# Patient Record
Sex: FEMALE | Race: WHITE | NOT HISPANIC OR LATINO | URBAN - METROPOLITAN AREA
[De-identification: names, ages, dates, MRNs, and addresses within clinical notes are randomized per-mention and may not be internally consistent; named-entity substitution may affect disease eponyms.]

---

## 2023-08-05 ENCOUNTER — OFFICE VISIT (OUTPATIENT)
Dept: URGENT CARE | Facility: CLINIC | Age: 48
End: 2023-08-05
Payer: COMMERCIAL

## 2023-08-05 VITALS
BODY MASS INDEX: 25.83 KG/M2 | WEIGHT: 155 LBS | HEART RATE: 60 BPM | HEIGHT: 65 IN | TEMPERATURE: 98.6 F | RESPIRATION RATE: 18 BRPM | OXYGEN SATURATION: 98 % | DIASTOLIC BLOOD PRESSURE: 77 MMHG | SYSTOLIC BLOOD PRESSURE: 128 MMHG

## 2023-08-05 DIAGNOSIS — L23.7 POISON IVY DERMATITIS: Primary | ICD-10-CM

## 2023-08-05 PROCEDURE — 99203 OFFICE O/P NEW LOW 30 MIN: CPT | Performed by: STUDENT IN AN ORGANIZED HEALTH CARE EDUCATION/TRAINING PROGRAM

## 2023-08-05 RX ORDER — TAMOXIFEN CITRATE 20 MG/1
TABLET ORAL
COMMUNITY
Start: 2023-08-01

## 2023-08-05 RX ORDER — PREDNISONE 10 MG/1
TABLET ORAL
Qty: 35 TABLET | Refills: 0 | Status: SHIPPED | OUTPATIENT
Start: 2023-08-05 | End: 2023-08-19

## 2023-08-05 NOTE — PROGRESS NOTES
[facility]        NAME: Marja Meigs is a 57 y.o. female  : 1975    MRN: [id number]  DATE: 2023  TIME: 10:31 AM    Assessment and Orders   Poison ivy dermatitis [L23.7]  1. Poison ivy dermatitis  predniSONE 10 mg tablet            Plan and Discussion      Given proximity to the eye, will treat with oral steroids versus topical.  Patient given 15-day course of oral prednisone. Discussed ED precautions including (but not limited to)  • Difficultly breathing or shortness of breath  • Chest pain  • Acutely worsening symptoms. Risks and benefits discussed. Patient understands and agrees with the plan. Follow up with PCP. Chief Complaint     Chief Complaint   Patient presents with   • Poison Ivy     Started 2 days ago; on face, stomach, arms,            History of Present Illness       Poison Reita Lime  This is a new problem. The current episode started in the past 7 days. The affected locations include the face. The rash is characterized by redness and itchiness. She was exposed to plant contact. Associated symptoms include eye pain and facial edema. Pertinent negatives include no anorexia, fever, shortness of breath, sore throat or vomiting. Past treatments include topical steroids. The treatment provided no relief. Review of Systems   Review of Systems   Constitutional: Negative for fever. HENT: Negative for sore throat. Eyes: Positive for pain. Respiratory: Negative for shortness of breath. Gastrointestinal: Negative for anorexia and vomiting. Current Medications       Current Outpatient Medications:   •  predniSONE 10 mg tablet, Take 4 tablets (40 mg total) by mouth daily for 5 days, THEN 2 tablets (20 mg total) daily for 5 days, THEN 1 tablet (10 mg total) daily for 5 days. , Disp: 35 tablet, Rfl: 0  •  tamoxifen (NOLVADEX) 20 mg tablet, , Disp: , Rfl:     Current Allergies     Allergies as of 2023 - Reviewed 2023   Allergen Reaction Noted • Nickel Itching 01/23/2019            The following portions of the patient's history were reviewed and updated as appropriate: allergies, current medications, past family history, past medical history, past social history, past surgical history and problem list.     Past Medical History:   Diagnosis Date   • Breast cancer [facility])        Past Surgical History:   Procedure Laterality Date   • TOTAL HIP ARTHROPLASTY Right 12/2022       History reviewed. No pertinent family history. Medications have been verified. Objective   /77   Pulse 60   Temp 98.6 °F (37 °C)   Resp 18   Ht 5' 5" (1.651 m)   Wt 70.3 kg (155 lb)   LMP  (LMP Unknown)   SpO2 98%   BMI 25.79 kg/m²   No LMP recorded (lmp unknown). Physical Exam     Physical Exam  Constitutional:       General: She is not in acute distress. Appearance: She is normal weight. Cardiovascular:      Rate and Rhythm: Normal rate. Pulmonary:      Effort: No respiratory distress. Skin:     Findings: Rash present. Neurological:      General: No focal deficit present. Mental Status: She is alert and oriented to person, place, and time. Psychiatric:         Mood and Affect: Mood normal.         Behavior: Behavior normal.         Thought Content:  Thought content normal.               Orvel Fly Viotto DO